# Patient Record
Sex: MALE | Race: WHITE | NOT HISPANIC OR LATINO
[De-identification: names, ages, dates, MRNs, and addresses within clinical notes are randomized per-mention and may not be internally consistent; named-entity substitution may affect disease eponyms.]

---

## 2018-09-23 PROBLEM — Z00.00 ENCOUNTER FOR PREVENTIVE HEALTH EXAMINATION: Status: ACTIVE | Noted: 2018-09-23

## 2018-10-31 ENCOUNTER — APPOINTMENT (OUTPATIENT)
Dept: CARDIOLOGY | Facility: CLINIC | Age: 73
End: 2018-10-31
Payer: MEDICARE

## 2018-10-31 VITALS
HEIGHT: 67 IN | OXYGEN SATURATION: 96 % | DIASTOLIC BLOOD PRESSURE: 74 MMHG | BODY MASS INDEX: 25.27 KG/M2 | HEART RATE: 76 BPM | WEIGHT: 161 LBS | SYSTOLIC BLOOD PRESSURE: 110 MMHG

## 2018-10-31 DIAGNOSIS — Z82.49 FAMILY HISTORY OF ISCHEMIC HEART DISEASE AND OTHER DISEASES OF THE CIRCULATORY SYSTEM: ICD-10-CM

## 2018-10-31 DIAGNOSIS — Z86.79 PERSONAL HISTORY OF OTHER DISEASES OF THE CIRCULATORY SYSTEM: ICD-10-CM

## 2018-10-31 DIAGNOSIS — Z78.9 OTHER SPECIFIED HEALTH STATUS: ICD-10-CM

## 2018-10-31 DIAGNOSIS — Z86.39 PERSONAL HISTORY OF OTHER ENDOCRINE, NUTRITIONAL AND METABOLIC DISEASE: ICD-10-CM

## 2018-10-31 PROCEDURE — 93000 ELECTROCARDIOGRAM COMPLETE: CPT

## 2018-10-31 RX ORDER — ATORVASTATIN CALCIUM 20 MG/1
20 TABLET, FILM COATED ORAL DAILY
Refills: 0 | Status: ACTIVE | COMMUNITY

## 2018-10-31 RX ORDER — AMLODIPINE BESYLATE 2.5 MG/1
2.5 TABLET ORAL DAILY
Refills: 0 | Status: ACTIVE | COMMUNITY

## 2018-11-08 ENCOUNTER — APPOINTMENT (OUTPATIENT)
Dept: CARDIOLOGY | Facility: CLINIC | Age: 73
End: 2018-11-08
Payer: MEDICARE

## 2018-11-08 PROCEDURE — 93306 TTE W/DOPPLER COMPLETE: CPT

## 2018-11-08 PROCEDURE — 93880 EXTRACRANIAL BILAT STUDY: CPT

## 2018-11-08 PROCEDURE — 93979 VASCULAR STUDY: CPT

## 2018-11-15 ENCOUNTER — APPOINTMENT (OUTPATIENT)
Dept: CARDIOLOGY | Facility: CLINIC | Age: 73
End: 2018-11-15
Payer: MEDICARE

## 2018-11-15 PROCEDURE — 93351 STRESS TTE COMPLETE: CPT

## 2018-11-20 ENCOUNTER — APPOINTMENT (OUTPATIENT)
Dept: CARDIOLOGY | Facility: CLINIC | Age: 73
End: 2018-11-20
Payer: MEDICARE

## 2018-11-26 PROCEDURE — 93224 XTRNL ECG REC UP TO 48 HRS: CPT

## 2018-12-06 ENCOUNTER — APPOINTMENT (OUTPATIENT)
Dept: CARDIOLOGY | Facility: CLINIC | Age: 73
End: 2018-12-06
Payer: MEDICARE

## 2018-12-06 VITALS
OXYGEN SATURATION: 98 % | DIASTOLIC BLOOD PRESSURE: 76 MMHG | HEART RATE: 78 BPM | SYSTOLIC BLOOD PRESSURE: 142 MMHG | WEIGHT: 170 LBS | BODY MASS INDEX: 27.32 KG/M2 | HEIGHT: 66 IN

## 2018-12-06 PROCEDURE — 99214 OFFICE O/P EST MOD 30 MIN: CPT

## 2018-12-06 RX ORDER — ZOLPIDEM TARTRATE 10 MG/1
10 TABLET ORAL
Qty: 30 | Refills: 0 | Status: ACTIVE | COMMUNITY
Start: 2018-11-29

## 2018-12-06 NOTE — REASON FOR VISIT
[Consultation] : a consultation regarding [Abnormal ECG] : an abnormal ECG [Hyperlipidemia] : hyperlipidemia [Hypertension] : hypertension [FreeTextEntry2] : noninvasive testing for VARGAS, CAD risk factors [FreeTextEntry1] : Casimiro is a 73-year-old male with history of dyslipidemia, on Lipitor 20 mg per day,  hypertension on amlodipine 2.5mg daily and HCTZ, palpitations, atypical chest pain, and reflux GERD. \par \par Patient has dyspnea with moderate exertion. Cardiovascular review of symptoms is negative for exertional chest pain, palpitations, dizziness or syncope.  No PND or orthopnea leg edema.  No bleeding or black stool.\par   \par Patient does not exercise on a routine basis.  Patient is walking 5 minutes with exertional chest.  Patient has limited exercise because of left hip arthritic pain. \par \par Exercise stress echo November 2018 suboptimal images LVEF 60%, exercise-induced hypokinesis basal inferior and inferolateral wall, equivocal inferior ST abnormality on EKG, no angina.  Sequential PACs and PVCs.  Patient will be scheduled for exercise Myoview stress test. \par \par Holter monitor November 2018, sinus rhythm , average heart rate 69 bpm, rare PACs and PVCs, no symptoms\par \par Echocardiogram November 2018 LVEF 50-55%, mild MR and TR normal PASP\par \par Carotid and abdominal ultrasound November 2018, mild nonobstructive plaque, normal abdominal aortic size\par \par EKG October 2018, sinus rhythm, PRWP, age undetermined anterior septal MI. \par \par Exercise Myoview stress January 2013 revealing normal LVEF, normal perfusion, nonischemic EKG response, baseline sinus rhythm, poor R-wave progression, and nonspecific ST-T wave abnormality. \par \par Echocardiogram performed in February 2014 revealing normal LVEF of 55%, mild left atrial enlargement, normal wall thickness, mild diastolic dysfunction, trace to mild MR, mild PI, trace to mild TR, unchanged from prior echo. \par \par Carotid and abdominal ultrasound performed in January 2013 revealing nonobstructive findings and normal aortic dimension\par  \par \par

## 2018-12-06 NOTE — PHYSICAL EXAM
[General Appearance - Well Developed] : well developed [Normal Appearance] : normal appearance [Well Groomed] : well groomed [General Appearance - Well Nourished] : well nourished [No Deformities] : no deformities [General Appearance - In No Acute Distress] : no acute distress [Normal Conjunctiva] : the conjunctiva exhibited no abnormalities [Eyelids - No Xanthelasma] : the eyelids demonstrated no xanthelasmas [Normal Oral Mucosa] : normal oral mucosa [No Oral Pallor] : no oral pallor [No Oral Cyanosis] : no oral cyanosis [Normal Jugular Venous A Waves Present] : normal jugular venous A waves present [Normal Jugular Venous V Waves Present] : normal jugular venous V waves present [No Jugular Venous Torres A Waves] : no jugular venous torres A waves [Respiration, Rhythm And Depth] : normal respiratory rhythm and effort [Exaggerated Use Of Accessory Muscles For Inspiration] : no accessory muscle use [Auscultation Breath Sounds / Voice Sounds] : lungs were clear to auscultation bilaterally [Heart Rate And Rhythm] : heart rate and rhythm were normal [Heart Sounds] : normal S1 and S2 [Murmurs] : no murmurs present [Abdomen Soft] : soft [Abdomen Tenderness] : non-tender [Abdomen Mass (___ Cm)] : no abdominal mass palpated [Abnormal Walk] : normal gait [Gait - Sufficient For Exercise Testing] : the gait was sufficient for exercise testing [Nail Clubbing] : no clubbing of the fingernails [Cyanosis, Localized] : no localized cyanosis [Petechial Hemorrhages (___cm)] : no petechial hemorrhages [Skin Color & Pigmentation] : normal skin color and pigmentation [] : no rash [No Venous Stasis] : no venous stasis [Skin Lesions] : no skin lesions [No Skin Ulcers] : no skin ulcer [No Xanthoma] : no  xanthoma was observed [Oriented To Time, Place, And Person] : oriented to person, place, and time [Affect] : the affect was normal [Mood] : the mood was normal [No Anxiety] : not feeling anxious

## 2018-12-06 NOTE — DISCUSSION/SUMMARY
[FreeTextEntry1] : Casimiro is a 73-year-old male  with medical history detailed above and active medical issues including:\par \par Dyspnea moderate exertion, multiple cardiac risk factors, abnormal baseline EKG.  Abnormal stress echo. Patient will have noninvasive testing with a exercise Myoview stress test to assess for obstructive CAD, exercise-induced arrhythmia,  blood pressure response. \par \par -Hypertension at BP goal <130/80 on amlodipine and HCTZ\par \par - Dyslipidemia on statin therapy well tolerated\par \par - Reflux GERD.\par \par - Left hip arthritic pain, limited exercise capacity\par \par Advised patient to follow active lifestyle with regular cardiovascular exercise. Patient educated on lifestyle and diet modification with low sodium low fat diet and avoidance of excessive alcohol. Patient is aware to call with any symptoms or concerns. \par \par Patient will be seen in cardiology followup after a noninvasive testing. Current cardiac medications remain unchanged. Repeat labs will be ordered with PMD.\par \par Patient will follow-up with Sammi Coy for primary care.  \par \par  no

## 2018-12-19 ENCOUNTER — APPOINTMENT (OUTPATIENT)
Dept: CARDIOLOGY | Facility: CLINIC | Age: 73
End: 2018-12-19

## 2019-01-22 ENCOUNTER — APPOINTMENT (OUTPATIENT)
Dept: CARDIOLOGY | Facility: CLINIC | Age: 74
End: 2019-01-22
Payer: MEDICARE

## 2019-01-22 PROCEDURE — A9502: CPT

## 2019-01-22 PROCEDURE — 93015 CV STRESS TEST SUPVJ I&R: CPT

## 2019-01-22 PROCEDURE — 78452 HT MUSCLE IMAGE SPECT MULT: CPT

## 2019-01-30 ENCOUNTER — APPOINTMENT (OUTPATIENT)
Dept: CARDIOLOGY | Facility: CLINIC | Age: 74
End: 2019-01-30
Payer: MEDICARE

## 2019-01-30 VITALS
SYSTOLIC BLOOD PRESSURE: 110 MMHG | DIASTOLIC BLOOD PRESSURE: 70 MMHG | BODY MASS INDEX: 25.9 KG/M2 | OXYGEN SATURATION: 98 % | HEIGHT: 67 IN | WEIGHT: 165 LBS | HEART RATE: 60 BPM

## 2019-01-30 DIAGNOSIS — R07.89 OTHER CHEST PAIN: ICD-10-CM

## 2019-01-30 PROCEDURE — 99214 OFFICE O/P EST MOD 30 MIN: CPT

## 2019-01-30 RX ORDER — HYDROCHLOROTHIAZIDE 12.5 MG/1
TABLET ORAL
Refills: 0 | Status: DISCONTINUED | COMMUNITY
End: 2019-01-30

## 2019-01-30 RX ORDER — ASPIRIN 325 MG/1
TABLET, FILM COATED ORAL DAILY
Refills: 0 | Status: DISCONTINUED | COMMUNITY
End: 2019-01-30

## 2019-01-30 RX ORDER — MECLIZINE HYDROCHLORIDE 25 MG/1
25 TABLET ORAL
Qty: 20 | Refills: 0 | Status: DISCONTINUED | COMMUNITY
Start: 2018-06-21 | End: 2019-01-30

## 2019-01-30 NOTE — DISCUSSION/SUMMARY
[FreeTextEntry1] : Casimiro is a 73-year-old male  with medical history detailed above and active medical issues including:\par \par Mild Dyspnea on exertion, multiple cardiac risk factors, abnormal baseline EKG.  Abnormal stress echo. Patient had normal SPECT with  exercise Myoview. The findings were discussed in detail.\par \par -Hypertension at BP goal <130/80 on amlodipine and HCTZ\par \par - Dyslipidemia on statin therapy well tolerated\par \par - Reflux GERD.\par \par - Left hip arthritic pain, limited exercise capacity\par \par Advised patient to follow active lifestyle with regular cardiovascular exercise. Patient educated on lifestyle and diet modification with low sodium low fat diet and avoidance of excessive alcohol. Patient is aware to call with any cardiac symptoms or concerns. \par \par Patient will follow-up with Sammi Coy for primary care.  \par \par Sincerely,\par Franky Castillo MD, FACC, [facility] \par \par

## 2019-01-30 NOTE — REASON FOR VISIT
[Consultation] : a consultation regarding [Abnormal ECG] : an abnormal ECG [Hyperlipidemia] : hyperlipidemia [Hypertension] : hypertension [FreeTextEntry2] : noninvasive testing for VARGAS, CAD risk factors [FreeTextEntry1] : Casimiro is a 73-year-old male with history of dyslipidemia, on Lipitor 20 mg per day,  hypertension on amlodipine 2.5mg daily and HCTZ, palpitations, atypical chest pain, and reflux GERD. \par \par Patient has mild dyspnea with moderate exertion. Cardiovascular review of symptoms is negative for exertional chest pain, palpitations, dizziness or syncope.  No PND or orthopnea leg edema.  No bleeding or black stool.\par   \par Patient does not exercise on a routine basis.   Patient has limited exercise because of left hip arthritic pain. \par \par Exercise stress echo November 2018 suboptimal images LVEF 60%,possible  exercise-induced hypokinesis basal inferior and inferolateral wall, equivocal inferior ST abnormality on EKG, no angina.  Nuclear stress test with SPECT was reported with normal perfusion at low level of exercise without angina.\par \par Holter monitor November 2018, sinus rhythm , average heart rate 69 bpm, rare PACs and PVCs, no symptoms\par \par Echocardiogram November 2018 LVEF 50-55%, mild MR and TR normal PASP\par \par Carotid and abdominal ultrasound November 2018, mild nonobstructive plaque, normal abdominal aortic size\par \par Exercise Myoview stress January 2013 revealing normal LVEF, normal perfusion, nonischemic EKG response, baseline sinus rhythm, poor R-wave progression, and nonspecific ST-T wave abnormality. \par \par \par \par

## 2019-01-30 NOTE — REVIEW OF SYSTEMS
[Shortness Of Breath] : no shortness of breath [Dyspnea on exertion] : dyspnea during exertion [Chest  Pressure] : no chest pressure [Chest Pain] : no chest pain [Lower Ext Edema] : no extremity edema [Leg Claudication] : no intermittent leg claudication [Palpitations] : no palpitations [Negative] : Heme/Lymph

## 2019-03-12 ENCOUNTER — TRANSCRIPTION ENCOUNTER (OUTPATIENT)
Age: 74
End: 2019-03-12

## 2021-09-30 ENCOUNTER — NON-APPOINTMENT (OUTPATIENT)
Age: 76
End: 2021-09-30

## 2021-09-30 ENCOUNTER — APPOINTMENT (OUTPATIENT)
Dept: CARDIOLOGY | Facility: CLINIC | Age: 76
End: 2021-09-30
Payer: MEDICARE

## 2021-09-30 VITALS
HEART RATE: 60 BPM | BODY MASS INDEX: 26.06 KG/M2 | SYSTOLIC BLOOD PRESSURE: 120 MMHG | HEIGHT: 67 IN | DIASTOLIC BLOOD PRESSURE: 72 MMHG | TEMPERATURE: 97.5 F | OXYGEN SATURATION: 99 % | WEIGHT: 166 LBS

## 2021-09-30 DIAGNOSIS — R94.39 ABNORMAL RESULT OF OTHER CARDIOVASCULAR FUNCTION STUDY: ICD-10-CM

## 2021-09-30 PROCEDURE — 99214 OFFICE O/P EST MOD 30 MIN: CPT

## 2021-09-30 RX ORDER — GLUCOSAMINE/MSM/CHONDROIT SULF 500-166.6
20 TABLET ORAL
Refills: 0 | Status: DISCONTINUED | COMMUNITY
End: 2021-09-30

## 2021-09-30 RX ORDER — HYDROCHLOROTHIAZIDE 12.5 MG/1
12.5 TABLET ORAL DAILY
Refills: 0 | Status: DISCONTINUED | COMMUNITY
End: 2021-09-30

## 2021-09-30 RX ORDER — ASPIRIN 81 MG/1
81 TABLET, COATED ORAL DAILY
Refills: 0 | Status: DISCONTINUED | COMMUNITY
End: 2021-09-30

## 2021-09-30 NOTE — DISCUSSION/SUMMARY
[FreeTextEntry1] : Casimiro is a 76-year-old male  with medical history detailed above and active medical issues including:\par \par - No exertional symptoms, normal perfusion Myoview stress test with normal LVEF, November 2018\par \par - Hypertension average resting home BPs at guideline goal on on amlodipine \par \par - Dyslipidemia on statin therapy well tolerated\par \par - Reflux GERD.\par \par - Left hip arthritic pain, limited exercise capacity\par \par Advised patient to follow active lifestyle with regular cardiovascular exercise. Patient educated on lifestyle and diet modification with low sodium low fat diet and avoidance of excessive alcohol. Patient is aware to call with any cardiac symptoms or concerns. \par \par Patient will follow-up with Sammi Coy for primary care.  \par \par

## 2021-09-30 NOTE — REASON FOR VISIT
[Abnormal ECG] : an abnormal ECG [Hyperlipidemia] : hyperlipidemia [Hypertension] : hypertension [Other: ____] : [unfilled] [FreeTextEntry1] : Casimiro is a 76-year-old male with history of dyslipidemia, on Lipitor 20 mg per day,  hypertension on amlodipine 2.5mg daily and HCTZ, palpitations, atypical chest pain, and reflux GERD. \par \par Cardiovascular review of symptoms is negative for exertional chest pain, dyspnea, palpitations, dizziness or syncope.  No PND or orthopnea leg edema.  No bleeding or black stool.\par   \par Patient is walking 30 minutes without exertional symptoms.  \par \par Exercise stress echo November 2018 suboptimal images LVEF 60%,possible  exercise-induced hypokinesis basal inferior and inferolateral wall, equivocal inferior ST abnormality on EKG, no angina.  Nuclear stress test with SPECT was reported with normal perfusion at low level of exercise without angina.\par \par Holter monitor November 2018, sinus rhythm , average heart rate 69 bpm, rare PACs and PVCs, no symptoms\par \par Echocardiogram November 2018 LVEF 50-55%, mild MR and TR normal PASP\par \par Carotid and abdominal ultrasound November 2018, mild nonobstructive plaque, normal abdominal aortic size\par \par Exercise Myoview stress January 2013 revealing normal LVEF, normal perfusion, nonischemic EKG response, baseline sinus rhythm, poor R-wave progression, and nonspecific ST-T wave abnormality. \par \par \par \par

## 2021-09-30 NOTE — PHYSICAL EXAM
[General Appearance - Well Developed] : well developed [Normal Appearance] : normal appearance [Well Groomed] : well groomed [General Appearance - Well Nourished] : well nourished [No Deformities] : no deformities [General Appearance - In No Acute Distress] : no acute distress [Eyelids - No Xanthelasma] : the eyelids demonstrated no xanthelasmas [Normal Oral Mucosa] : normal oral mucosa [No Oral Pallor] : no oral pallor [No Oral Cyanosis] : no oral cyanosis [Normal Jugular Venous A Waves Present] : normal jugular venous A waves present [Normal Jugular Venous V Waves Present] : normal jugular venous V waves present [No Jugular Venous Torres A Waves] : no jugular venous torres A waves [Respiration, Rhythm And Depth] : normal respiratory rhythm and effort [Exaggerated Use Of Accessory Muscles For Inspiration] : no accessory muscle use [Auscultation Breath Sounds / Voice Sounds] : lungs were clear to auscultation bilaterally [Heart Rate And Rhythm] : heart rate and rhythm were normal [Heart Sounds] : normal S1 and S2 [Murmurs] : no murmurs present [Abdomen Soft] : soft [Abdomen Tenderness] : non-tender [Abdomen Mass (___ Cm)] : no abdominal mass palpated [Abnormal Walk] : normal gait [Gait - Sufficient For Exercise Testing] : the gait was sufficient for exercise testing [Nail Clubbing] : no clubbing of the fingernails [Cyanosis, Localized] : no localized cyanosis [Petechial Hemorrhages (___cm)] : no petechial hemorrhages [Skin Color & Pigmentation] : normal skin color and pigmentation [] : no rash [No Venous Stasis] : no venous stasis [Skin Lesions] : no skin lesions [No Skin Ulcers] : no skin ulcer [No Xanthoma] : no  xanthoma was observed [Oriented To Time, Place, And Person] : oriented to person, place, and time [Affect] : the affect was normal [Mood] : the mood was normal [No Anxiety] : not feeling anxious [Well Developed] : well developed [Well Nourished] : well nourished [No Acute Distress] : no acute distress [Normal Conjunctiva] : normal conjunctiva [Normal Venous Pressure] : normal venous pressure [No Carotid Bruit] : no carotid bruit [Normal S1, S2] : normal S1, S2 [No Murmur] : no murmur [No Rub] : no rub [No Gallop] : no gallop [Clear Lung Fields] : clear lung fields [Good Air Entry] : good air entry [No Respiratory Distress] : no respiratory distress  [Soft] : abdomen soft [Non Tender] : non-tender [No Masses/organomegaly] : no masses/organomegaly [Normal Bowel Sounds] : normal bowel sounds [Normal Gait] : normal gait [No Edema] : no edema [No Cyanosis] : no cyanosis [No Clubbing] : no clubbing [No Varicosities] : no varicosities [No Rash] : no rash [No Skin Lesions] : no skin lesions [Moves all extremities] : moves all extremities [No Focal Deficits] : no focal deficits [Normal Speech] : normal speech [Alert and Oriented] : alert and oriented [Normal memory] : normal memory

## 2021-10-29 ENCOUNTER — APPOINTMENT (OUTPATIENT)
Dept: CARDIOLOGY | Facility: CLINIC | Age: 76
End: 2021-10-29
Payer: MEDICARE

## 2021-10-29 PROCEDURE — 93306 TTE W/DOPPLER COMPLETE: CPT

## 2021-10-29 PROCEDURE — ZZZZZ: CPT

## 2021-10-29 PROCEDURE — 93880 EXTRACRANIAL BILAT STUDY: CPT

## 2021-11-08 ENCOUNTER — APPOINTMENT (OUTPATIENT)
Dept: CARDIOLOGY | Facility: CLINIC | Age: 76
End: 2021-11-08
Payer: MEDICARE

## 2021-11-08 DIAGNOSIS — I49.3 VENTRICULAR PREMATURE DEPOLARIZATION: ICD-10-CM

## 2021-11-08 PROCEDURE — 99443: CPT | Mod: 95

## 2021-11-08 NOTE — PHYSICAL EXAM
[Well Developed] : well developed [Well Nourished] : well nourished [No Acute Distress] : no acute distress [Normal Venous Pressure] : normal venous pressure [No Carotid Bruit] : no carotid bruit [Normal S1, S2] : normal S1, S2 [No Murmur] : no murmur [No Rub] : no rub [No Gallop] : no gallop [Clear Lung Fields] : clear lung fields [Good Air Entry] : good air entry [No Respiratory Distress] : no respiratory distress  [Soft] : abdomen soft [Non Tender] : non-tender [No Masses/organomegaly] : no masses/organomegaly [Normal Bowel Sounds] : normal bowel sounds [Normal Gait] : normal gait [No Edema] : no edema [No Cyanosis] : no cyanosis [No Clubbing] : no clubbing [No Varicosities] : no varicosities [No Rash] : no rash [No Skin Lesions] : no skin lesions [Moves all extremities] : moves all extremities [No Focal Deficits] : no focal deficits [Normal Speech] : normal speech [Alert and Oriented] : alert and oriented [Normal memory] : normal memory [General Appearance - Well Developed] : well developed [Normal Appearance] : normal appearance [Well Groomed] : well groomed [General Appearance - Well Nourished] : well nourished [No Deformities] : no deformities [General Appearance - In No Acute Distress] : no acute distress [Normal Conjunctiva] : the conjunctiva exhibited no abnormalities [Eyelids - No Xanthelasma] : the eyelids demonstrated no xanthelasmas [Normal Oral Mucosa] : normal oral mucosa [No Oral Pallor] : no oral pallor [No Oral Cyanosis] : no oral cyanosis [Normal Jugular Venous A Waves Present] : normal jugular venous A waves present [Normal Jugular Venous V Waves Present] : normal jugular venous V waves present [No Jugular Venous Torres A Waves] : no jugular venous torres A waves [Respiration, Rhythm And Depth] : normal respiratory rhythm and effort [Exaggerated Use Of Accessory Muscles For Inspiration] : no accessory muscle use [Auscultation Breath Sounds / Voice Sounds] : lungs were clear to auscultation bilaterally [Heart Rate And Rhythm] : heart rate and rhythm were normal [Heart Sounds] : normal S1 and S2 [Murmurs] : no murmurs present [Abdomen Soft] : soft [Abdomen Tenderness] : non-tender [Abdomen Mass (___ Cm)] : no abdominal mass palpated [Abnormal Walk] : normal gait [Gait - Sufficient For Exercise Testing] : the gait was sufficient for exercise testing [Nail Clubbing] : no clubbing of the fingernails [Cyanosis, Localized] : no localized cyanosis [Petechial Hemorrhages (___cm)] : no petechial hemorrhages [Skin Color & Pigmentation] : normal skin color and pigmentation [] : no rash [No Venous Stasis] : no venous stasis [Skin Lesions] : no skin lesions [No Skin Ulcers] : no skin ulcer [No Xanthoma] : no  xanthoma was observed [Oriented To Time, Place, And Person] : oriented to person, place, and time [Affect] : the affect was normal [Mood] : the mood was normal [No Anxiety] : not feeling anxious

## 2021-11-08 NOTE — REASON FOR VISIT
[Other: ____] : [unfilled] [FreeTextEntry1] : Casimiro is a 76-year-old male with history of dyslipidemia, on Lipitor 20 mg per day,  hypertension on amlodipine 2.5mg daily and HCTZ, palpitations, atypical chest pain, and reflux GERD. \par \par Cardiovascular review of symptoms is negative for exertional chest pain, dyspnea, palpitations, dizziness or syncope.  No PND or orthopnea leg edema.  No bleeding or black stool.\par   \par Patient is walking 30 minutes without exertional symptoms.  \par \par Exercise stress echo November 2018 suboptimal images LVEF 60%,possible  exercise-induced hypokinesis basal inferior and inferolateral wall, equivocal inferior ST abnormality on EKG, no angina.  Nuclear stress test with SPECT was reported with normal perfusion at low level of exercise without angina.\par \par Holter monitor November 2018, sinus rhythm , average heart rate 69 bpm, rare PACs and PVCs, no symptoms\par \par Echocardiogram November 2018 LVEF 50-55%, mild MR and TR normal PASP\par \par Carotid and abdominal ultrasound November 2018, mild nonobstructive plaque, normal abdominal aortic size\par \par Exercise Myoview stress January 2013 revealing normal LVEF, normal perfusion, nonischemic EKG response, baseline sinus rhythm, poor R-wave progression, and nonspecific ST-T wave abnormality. \par \par \par \par  [Abnormal ECG] : an abnormal ECG [Hyperlipidemia] : hyperlipidemia [Hypertension] : hypertension

## 2023-08-29 ENCOUNTER — APPOINTMENT (OUTPATIENT)
Dept: CARDIOLOGY | Facility: CLINIC | Age: 78
End: 2023-08-29
Payer: MEDICARE

## 2023-08-29 VITALS
BODY MASS INDEX: 26.84 KG/M2 | SYSTOLIC BLOOD PRESSURE: 132 MMHG | HEIGHT: 67 IN | DIASTOLIC BLOOD PRESSURE: 82 MMHG | OXYGEN SATURATION: 98 % | HEART RATE: 56 BPM | WEIGHT: 171 LBS

## 2023-08-29 DIAGNOSIS — R94.31 ABNORMAL ELECTROCARDIOGRAM [ECG] [EKG]: ICD-10-CM

## 2023-08-29 DIAGNOSIS — I49.1 ATRIAL PREMATURE DEPOLARIZATION: ICD-10-CM

## 2023-08-29 DIAGNOSIS — E78.5 HYPERLIPIDEMIA, UNSPECIFIED: ICD-10-CM

## 2023-08-29 DIAGNOSIS — I10 ESSENTIAL (PRIMARY) HYPERTENSION: ICD-10-CM

## 2023-08-29 DIAGNOSIS — R06.09 OTHER FORMS OF DYSPNEA: ICD-10-CM

## 2023-08-29 PROCEDURE — 99215 OFFICE O/P EST HI 40 MIN: CPT

## 2023-08-29 NOTE — REASON FOR VISIT
[Other: ____] : [unfilled] [Abnormal ECG] : an abnormal ECG [Hyperlipidemia] : hyperlipidemia [Hypertension] : hypertension [FreeTextEntry1] : Casimiro is a 78-year-old male with history of dyslipidemia, on Lipitor 20 mg per day,  hypertension on amlodipine 2.5mg daily and HCTZ, palpitations, atypical chest pain, and reflux GERD.  Patient has dyspnea with moderate exertion.  Cardiovascular review of symptoms is negative for exertional chest pain, palpitations, dizziness or syncope.  No PND or orthopnea leg edema.  No bleeding or black stool.    No exercise routine.  Patient is walking 15 minutes on occasion.    Exercise stress echo November 2018 suboptimal images LVEF 60%,possible  exercise-induced hypokinesis basal inferior and inferolateral wall, equivocal inferior ST abnormality on EKG, no angina.  Nuclear stress test with SPECT was reported with normal perfusion at low level of exercise without angina.  Holter monitor November 2018, sinus rhythm , average heart rate 69 bpm, rare PACs and PVCs, no symptoms  Echocardiogram November 2018 LVEF 50-55%, mild MR and TR normal PASP  Carotid and abdominal ultrasound November 2018, mild nonobstructive plaque, normal abdominal aortic size  Exercise Myoview stress January 2013 revealing normal LVEF, normal perfusion, nonischemic EKG response, baseline sinus rhythm, poor R-wave progression, and nonspecific ST-T wave abnormality.

## 2023-08-29 NOTE — DISCUSSION/SUMMARY
[FreeTextEntry1] : Casimiro is a 78-year-old male with medical history detailed above and active medical issues including:  - Dyspnea on exertion, CAD risk factors.  Coronary CTA and coronary calcium score ordered to assess for obstructive CAD and risk stratification.  Echocardiogram ordered to evaluate for structural heart disease, carotid and abdominal ultrasound to evaluate for PAD.  - Hypertension average resting home BPs at guideline goal on on Toprol  - Dyslipidemia on rosuvastatin well tolerated  - Reflux GERD.  - Left hip arthritic pain, limited exercise capacity  Advised patient to follow active lifestyle with regular cardiovascular exercise. Patient educated on lifestyle and diet modification with low sodium low fat diet and avoidance of excessive alcohol. Patient is aware to call with any cardiac symptoms or concerns.   Cardiology follow-up after noninvasive testing  Patient will follow-up with Sammi Coy for primary care.    Total time spent 45 minutes, reviewing of test results, chart information, patient discussion, physical exam and completion of chart documentation.

## 2023-12-21 ENCOUNTER — APPOINTMENT (OUTPATIENT)
Dept: CARDIOLOGY | Facility: CLINIC | Age: 78
End: 2023-12-21

## 2024-01-04 ENCOUNTER — APPOINTMENT (OUTPATIENT)
Dept: CARDIOLOGY | Facility: CLINIC | Age: 79
End: 2024-01-04